# Patient Record
Sex: MALE
[De-identification: names, ages, dates, MRNs, and addresses within clinical notes are randomized per-mention and may not be internally consistent; named-entity substitution may affect disease eponyms.]

---

## 2017-09-02 ENCOUNTER — HOSPITAL ENCOUNTER (EMERGENCY)
Dept: HOSPITAL 31 - C.ER | Age: 32
Discharge: HOME | End: 2017-09-02
Payer: MEDICAID

## 2017-09-02 VITALS — HEART RATE: 84 BPM | DIASTOLIC BLOOD PRESSURE: 75 MMHG | SYSTOLIC BLOOD PRESSURE: 121 MMHG | RESPIRATION RATE: 16 BRPM

## 2017-09-02 VITALS — OXYGEN SATURATION: 98 % | TEMPERATURE: 98.2 F

## 2017-09-02 DIAGNOSIS — K08.89: Primary | ICD-10-CM

## 2017-09-02 NOTE — C.PDOC
Time Seen by Provider: 09/02/17 12:42


Chief Complaint (Nursing): Dental Pain





Past Medical History


Vital Signs: 





 Last Vital Signs











Temp  98.2 F   09/02/17 12:26


 


Pulse  80   09/02/17 12:26


 


Resp  18   09/02/17 12:26


 


BP  119/80   09/02/17 12:26


 


Pulse Ox  98   09/02/17 12:26














- Social History


Hx Alcohol Use: No


Hx Substance Use: No





- Immunization History


Hx Tetanus Toxoid Vaccination: No


Hx Influenza Vaccination: No


Hx Pneumococcal Vaccination: No





ED Course And Treatment


O2 Sat by Pulse Oximetry: 98





Disposition





- Disposition


Referrals: 


[facility] [Outside]


Disposition: HOME/ ROUTINE


Disposition Time: 13:44


Condition: STABLE


Additional Instructions: 


Follow up with Dentist within 2-3 days. Return to ED if feel worse.


Prescriptions: 


Penicillin VK [Penicillin VK Tab] 500 mg PO Q6H #40 tab


Acetaminophen with Codeine [Tylenol with Codeine #3 Tablet] 1 each PO .Q4-6H #

30 tablet


Instructions:  Toothache (ED)


Forms:  [facility] (English), [facility]





- Clinical Impression


Clinical Impression: 


 Toothache

## 2017-09-02 NOTE — C.PDOC
History Of Present Illness





32 year old male presents to the ED with complaints of left lower jaw pain for 

approximately 2-3 days. Patient denies fever or difficulty swallowing. 


Time Seen by Provider: 09/02/17 12:42


Chief Complaint (Nursing): Dental Pain


History Per: Patient


History/Exam Limitations: no limitations


Onset/Duration Of Symptoms: Days (2-3 days )


Current Symptoms Are (Timing): Still Present


Quality: Positive for: "Pain"


Recent travel outside of the [place]: No





Past Medical History


Reviewed: Historical Data, Nursing Documentation, Vital Signs


Vital Signs: 


 Last Vital Signs











Temp  98.2 F   09/02/17 12:26


 


Pulse  80   09/02/17 12:26


 


Resp  18   09/02/17 12:26


 


BP  119/80   09/02/17 12:26


 


Pulse Ox  98   09/02/17 13:53











Family History: States: Unknown Family Hx





- Social History


Hx Alcohol Use: No


Hx Substance Use: No





- Immunization History


Hx Tetanus Toxoid Vaccination: No


Hx Influenza Vaccination: No


Hx Pneumococcal Vaccination: No





Review Of Systems


Constitutional: Negative for: Fever, Chills


ENT: Positive for: Other (left lower jaw pain ).  Negative for: Ear Pain, 

Throat Pain, Throat Swelling


Gastrointestinal: Negative for: Nausea, Vomiting


Neurological: Negative for: Weakness, Numbness, Headache





Physical Exam





- Physical Exam


Appears: Non-toxic, No Acute Distress


Skin: Warm, Dry


Head: Atraumatic, Normacephalic, No Swelling (no facial swelling )


Eye(s): bilateral: Normal Inspection, PERRL, EOMI


Ear(s): Bilateral: Normal, Other (No mastoid tenderness )


Oral Mucosa: Moist


Teeth: Other (Poor dentition )


Gingiva: Normal Appearing, No Erythema, No Swelling


Throat: Normal, No Erythema, No Exudate


Neck: Supple


Chest: Symmetrical, No Deformity


Cardiovascular: Rhythm Regular


Neurological/Psych: Oriented x3, Normal Speech, Normal Cognition, Normal Motor, 

Normal Sensation





ED Course And Treatment


O2 Sat by Pulse Oximetry: 98 (room air )


Progress Note: Patient was given Penicillin VK and Tylenol with codeine.





Disposition





- Disposition


Referrals: 


[facility]. [employer] [facility] [Outside]


Disposition: HOME/ ROUTINE


Disposition Time: 13:44


Condition: STABLE


Additional Instructions: 


Follow up with Dentist within 2-3 days. Return to ED if feel worse.


Prescriptions: 


Penicillin VK [Penicillin VK Tab] 500 mg PO Q6H #40 tab


Acetaminophen with Codeine [Tylenol with Codeine #3 Tablet] 1 each PO .Q4-6H #

30 tablet


Instructions:  Toothache (ED)


Forms:  [facility], [facility] (English)





- Clinical Impression


Clinical Impression: 


 Toothache








- Scribe Statement


The provider has reviewed the documentation as recorded by the Scribe





Anna Lobo





All medical record entries made by the Scribe were at my direction and 

personally dictated by me. I have reviewed the chart and agree that the record 

accurately reflects my personal performance of the history, physical exam, 

medical decision making, and the department course for this patient. I have 

also personally directed, reviewed, and agree with the discharge instructions 

and disposition.

## 2018-06-02 ENCOUNTER — HOSPITAL ENCOUNTER (EMERGENCY)
Dept: HOSPITAL 31 - C.ER | Age: 33
Discharge: HOME | End: 2018-06-02
Payer: COMMERCIAL

## 2018-06-02 VITALS
HEART RATE: 82 BPM | OXYGEN SATURATION: 98 % | SYSTOLIC BLOOD PRESSURE: 112 MMHG | DIASTOLIC BLOOD PRESSURE: 72 MMHG | RESPIRATION RATE: 16 BRPM | TEMPERATURE: 98 F

## 2018-06-02 DIAGNOSIS — L74.0: Primary | ICD-10-CM

## 2018-06-02 NOTE — C.PDOC
History Of Present Illness


23-year-old male, comes in for evaluation of a rash that started two days ago. 

Patient states he was soaking wet from the rain two days ago, after which he 

noted a pruritic rash on trunk and extremities. No medication taken to relieve 

symptoms. Denies any shortness of breath, nausea/vomiting, throat swelling, or 

any other associated symptoms. No other complaints at this time.


Time Seen by Provider: 06/02/18 12:47


Chief Complaint (Nursing): Abnormal Skin Integrity


History Per: Patient


History/Exam Limitations: no limitations


Onset/Duration Of Symptoms: Days (2)


Current Symptoms Are (Timing): Still Present


Quality Of Symptoms: Itching


Severity: Mild





Past Medical History


Reviewed: Historical Data, Nursing Documentation, Vital Signs


Vital Signs: 


 Last Vital Signs











Temp  98 F   06/02/18 12:35


 


Pulse  82   06/02/18 12:35


 


Resp  16   06/02/18 12:35


 


BP  112/72   06/02/18 12:35


 


Pulse Ox  98   06/02/18 14:14











Family History: States: No Known Family Hx





- Social History


Hx Alcohol Use: No


Hx Substance Use: No





- Immunization History


Hx Tetanus Toxoid Vaccination: No


Hx Influenza Vaccination: No


Hx Pneumococcal Vaccination: No





Review Of Systems


Constitutional: Negative for: Fever


ENT: Negative for: Throat Swelling


Cardiovascular: Negative for: Chest Pain


Respiratory: Negative for: Shortness of Breath


Skin: Positive for: Rash





Physical Exam





- Physical Exam


Appears: Non-toxic, No Acute Distress


Skin: Warm, Dry, Rash (scattered erythematous papules to trunk and extremities. 

Sparing palms and soles. )


Head: Atraumatic, Normacephalic


Eye(s): bilateral: Normal Inspection, PERRL, EOMI


Ear(s): Bilateral: Normal


Nose: Normal


Oral Mucosa: Moist


Tongue: Normal Appearing, No Swelling


Lips: Normal Appearing, No Swelling


Throat: No Erythema, No Drooling


Neck: Normal ROM, Supple


Chest: Symmetrical


Cardiovascular: Rhythm Regular, No Murmur


Respiratory: Normal Breath Sounds, No Accessory Muscle Use


Extremity: Normal ROM, No Deformity, No Swelling


Neurological/Psych: Oriented x3, Normal Speech





ED Course And Treatment


O2 Sat by Pulse Oximetry: 98 (RA)


Pulse Ox Interpretation: Normal





Medical Decision Making


Medical Decision Making: 


Impression:


Heat rash





Patient will be discharged home with Rx for Triamcinolone cream. Instructed to f

/u with PMD in 1-2 days. 





Disposition


Counseled Patient/Family Regarding: Studies Performed, Diagnosis, Need For 

Followup, Rx Given





- Disposition


Referrals: 


[facility] [Outside]


[facility] [Outside]


Disposition: HOME/ ROUTINE


Disposition Time: 12:51


Condition: STABLE


Additional Instructions: 


TAKE ALLEGRA 180 MG EVERY MORNING AND IF NEEDED BENADRYL 25 MG AT BED TIME FOR 

ITCH. 





FOLLOW UP WITH PMD/CLINIC NEXT WEEK FOR RE-EVALUATION.





IF SYMPTOMS GET WORSE OR ANY NEW CONCERNING SYMPTOMS DEVELOP RETURN TO ED. 


Prescriptions: 


Triamcinolone 0.1% [Triamcinolone 0.1% Cream] 1 apful TP BID #60 g


Instructions:  Heat Rash ([place] [facility])


Forms:  [facility] (English), General Discharge Instructions





- Clinical Impression


Clinical Impression: 


 Heat rash








- Scribe Statement


The provider has reviewed the documentation as recorded by the Scribe (Vic Cavazos)








All medical record entries made by the Scribe were at my direction and 

personally dictated by me. I have reviewed the chart and agree that the record 

accurately reflects my personal performance of the history, physical exam, 

medical decision making, and the department course for this patient. I have 

also personally directed, reviewed, and agree with the discharge instructions 

and disposition.

## 2018-06-03 ENCOUNTER — HOSPITAL ENCOUNTER (EMERGENCY)
Dept: HOSPITAL 31 - C.ER | Age: 33
Discharge: HOME | End: 2018-06-03
Payer: COMMERCIAL

## 2018-06-03 VITALS
HEART RATE: 87 BPM | TEMPERATURE: 97.9 F | SYSTOLIC BLOOD PRESSURE: 122 MMHG | DIASTOLIC BLOOD PRESSURE: 76 MMHG | OXYGEN SATURATION: 99 % | RESPIRATION RATE: 18 BRPM

## 2018-06-03 DIAGNOSIS — L50.0: Primary | ICD-10-CM

## 2018-06-03 NOTE — C.PDOC
History Of Present Illness


34 y/o undomiciled male presents to the ED for a rash. Patient states he was 

seen yesterday for a rash here at the ED that was found to be pleuritic. The 

rash began 2 days prior to yesterdays visit when he was caught in the rain. He 

was sent home with the medication Triamcinolone cream. After using the cream he 

felt a burning sensation to the hand and feet along with numbness to the lips. 

This prompted his visit to the ED. He denies any fever, chills, nausea, and 

vomiting. 





PMD: none provided


Time Seen by Provider: 06/03/18 09:12


Chief Complaint (Nursing): Allergic Reaction


History Per: Patient


History/Exam Limitations: no limitations


Onset/Duration Of Symptoms: Days


Current Symptoms Are (Timing): Still Present


Possible Cause: Medication


Associated Symptoms: Other (burning)


Home/EMS Treatment: None


Recent travel outside of the [place]: No





Past Medical History


Reviewed: Historical Data, Nursing Documentation, Vital Signs


Vital Signs: 


 Last Vital Signs











Temp  97.9 F   06/03/18 08:48


 


Pulse  87   06/03/18 08:48


 


Resp  18   06/03/18 08:48


 


BP  122/76   06/03/18 08:48


 


Pulse Ox  99   06/03/18 10:06














- Medical History


PMH: No Chronic Diseases


Surgical History: No Surg Hx


Family History: States: Unknown Family Hx





- Social History


Hx Tobacco Use: No


Hx Alcohol Use: No


Hx Substance Use: No





- Immunization History


Hx Tetanus Toxoid Vaccination: No


Hx Influenza Vaccination: No


Hx Pneumococcal Vaccination: No





Review Of Systems


Except As Marked, All Systems Reviewed And Found Negative.


Constitutional: Negative for: Fever, Chills


Gastrointestinal: Negative for: Nausea, Vomiting


Skin: Positive for: Rash, Other (burning sensation from medication cream)





Physical Exam





- Physical Exam


Appears: Non-toxic, No Acute Distress


Skin: No Normal Color (full body hives mainly to inner thighs and inner arms 

and back with excoriations as well)


Head: Atraumatic


Eye(s): bilateral: Normal Inspection, PERRL, EOMI


Oral Mucosa: Other (normal appearing)


Lips: Normal Appearing


Respiratory: Normal Breath Sounds


Neurological/Psych: Oriented x3 (awake and alert)





ED Course And Treatment


O2 Sat by Pulse Oximetry: 99 (RA)


Pulse Ox Interpretation: Normal





Medical Decision Making


Medical Decision Making: 


Time: 08:48





Impression: Hives, allergic reaction





Initial Plan:


* Benadryl 50 mg PO


* Pepcid 60 mg PO


* Prednisolone 40 mg PO





--------------------------------------------------------------------------------

-----------------


Scribe Attestation:


Documented by Slick Atkins acting as a scribe Jovanny James MD.





MD Scribmeri Attestation: 


All medical record entries made by the Scribe were at my direction and 

personally dictated by me. I have reviewed the chart and agree that the record 

accurately reflects my personal performance of the history, physical exam, 

medical decision making, and the department course for this patient. I have 

also personally directed, reviewed, and agree with the discharge instructions 

and disposition.





Disposition


Counseled Patient/Family Regarding: Diagnosis, Rx Given





- Disposition


Referrals: 


[facility] [Outside]


Disposition: HOME/ ROUTINE


Disposition Time: 10:00


Condition: STABLE


Additional Instructions: 


Follow up in the clinic. 


Take medications as indicated. 


Take one allegra and 60 mg of Prednisone in the morning. 


Take 50 mg of Benadryl at night. 


Prescriptions: 


DiphenhydrAMINE [Benadryl] 50 mg PO HS #5 cap


Fexofenadine HCl [Allegra**Nf**] 180 mg PO DAILY #20 tab


Prednisone [Deltasone] 60 mg PO DAILY #12 tablet


Instructions:  Hives


Forms:  General Discharge Instructions





- POA


Present On Arrival: None





- Clinical Impression


Clinical Impression: 


 Allergic urticaria

## 2019-03-13 ENCOUNTER — HOSPITAL ENCOUNTER (EMERGENCY)
Dept: HOSPITAL 31 - C.ER | Age: 34
Discharge: HOME | End: 2019-03-13
Payer: COMMERCIAL

## 2019-03-13 VITALS
SYSTOLIC BLOOD PRESSURE: 116 MMHG | HEART RATE: 81 BPM | RESPIRATION RATE: 20 BRPM | TEMPERATURE: 98.1 F | DIASTOLIC BLOOD PRESSURE: 78 MMHG | OXYGEN SATURATION: 98 %

## 2019-03-13 DIAGNOSIS — S90.821A: Primary | ICD-10-CM
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
## 2019-03-13 NOTE — C.PDOC
History Of Present Illness


32 y/o male presents to the ER complaining of new onset of right foot blister 

which has been present since yesterday. Patient states that the blister is on 

the bottom of the foot. Patient reports that he has to walk for long periods of 

time during his work. He notes that he had initially had fluid  which resolved. 

Denies having trauma, discharge, redness,fever and chills.


Time Seen by Provider: 03/13/19 11:14


Chief Complaint (Nursing): Lower Extremity Problem/Injury


History Per: Patient


History/Exam Limitations: no limitations


Onset/Duration Of Symptoms: Days


Current Symptoms Are (Timing): Still Present


Severity: Moderate





Past Medical History


Reviewed: Historical Data, Nursing Documentation, Vital Signs


Vital Signs: 





                                Last Vital Signs











Temp  98.1 F   03/13/19 10:16


 


Pulse  81   03/13/19 10:16


 


Resp  20   03/13/19 10:16


 


BP  116/78   03/13/19 10:16


 


Pulse Ox  98   03/13/19 10:16














- Medical History


PMH: No Chronic Diseases


Surgical History: No Surg Hx


Family History: States: No Known Family Hx





- Social History


Hx Tobacco Use: No


Hx Alcohol Use: Yes (history of drinking)


Hx Substance Use: No





- Immunization History


Hx Tetanus Toxoid Vaccination: No


Hx Influenza Vaccination: No


Hx Pneumococcal Vaccination: No





Review Of Systems


Except As Marked, All Systems Reviewed And Found Negative.


Constitutional: Negative for: Fever, Chills


Skin: Positive for: Other (right foot blister)





Physical Exam





- Physical Exam


Appears: Non-toxic, No Acute Distress


Skin: Warm, Dry, Other (dehisced deroofed blister to bottom of right forefoot, 

no surrounding erythema, no discharge)


Head: Atraumatic, Normacephalic


Eye(s): bilateral: Normal Inspection


Extremity: Normal ROM


Extremity: Left: Atraumatic (left foot)


Neurological/Psych: Oriented x3, Normal Speech





ED Course And Treatment


O2 Sat by Pulse Oximetry: 98 (RA)


Pulse Ox Interpretation: Normal





Medical Decision Making


Medical Decision Making: 


Plan:


--Wound Care 





Updates:


Patient states that he has family hx of diabetes, however he denies having PMhx 

of diabetes. Patient was offered finger stick, he declined because he is afraid 

of needles.





Patient has been instructed about wound care. Patient has been discharged and 

instructed to follow up with podiatry.





Disposition


Counseled Patient/Family Regarding: Diagnosis, Need For Followup





- Disposition


Referrals: 


[facility] [Outside]


[facility] [Outside]


Norma Umana DPM [Staff Provider] - 


Disposition: HOME/ ROUTINE


Disposition Time: 11:34


Condition: IMPROVED


Additional Instructions: 


WOUND CARE AS INSTRUCTED 2-3X/DAY. FOLLOW UP PODIATRY   


Instructions:  Skin Burns (DC)


Forms:  [facility] (English), Work Excuse





- Clinical Impression


Clinical Impression: 


 Blister of foot








- Scribe Statement


The provider has reviewed the documentation as recorded by the Scribe


Jaquan Dela Cruz


Provider Attestation: 





All medical record entries made by the Scribe were at my direction and 

personally dictated by me. I have reviewed the chart and agree that the record 

accurately reflects my personal performance of the history, physical exam, 

medical decision making, and the department course for this patient. I have also

personally directed, reviewed, and agree with the discharge instructions and 

disposition.